# Patient Record
Sex: MALE | Race: WHITE | Employment: OTHER | URBAN - METROPOLITAN AREA
[De-identification: names, ages, dates, MRNs, and addresses within clinical notes are randomized per-mention and may not be internally consistent; named-entity substitution may affect disease eponyms.]

---

## 2017-02-20 ENCOUNTER — OFFICE VISIT (OUTPATIENT)
Dept: CARDIOLOGY CLINIC | Age: 72
End: 2017-02-20

## 2017-02-20 VITALS
WEIGHT: 177 LBS | HEART RATE: 51 BPM | HEIGHT: 71 IN | OXYGEN SATURATION: 97 % | BODY MASS INDEX: 24.78 KG/M2 | DIASTOLIC BLOOD PRESSURE: 74 MMHG | SYSTOLIC BLOOD PRESSURE: 126 MMHG

## 2017-02-20 DIAGNOSIS — Z98.61 CAD S/P PERCUTANEOUS CORONARY ANGIOPLASTY: Primary | ICD-10-CM

## 2017-02-20 DIAGNOSIS — I25.10 CAD S/P PERCUTANEOUS CORONARY ANGIOPLASTY: Primary | ICD-10-CM

## 2017-02-20 DIAGNOSIS — E78.00 HYPERCHOLESTEROLEMIA: ICD-10-CM

## 2017-02-20 DIAGNOSIS — I10 ESSENTIAL HYPERTENSION: ICD-10-CM

## 2017-02-20 RX ORDER — LISINOPRIL 5 MG/1
5 TABLET ORAL DAILY
COMMUNITY
End: 2017-03-16 | Stop reason: SDUPTHER

## 2017-02-20 NOTE — MR AVS SNAPSHOT
Visit Information Date & Time Provider Department Dept. Phone Encounter #  
 2/20/2017  3:40 PM Karyle Fairly, MD [facility] [address] [id number] Upcoming Health Maintenance Date Due DTaP/Tdap/Td series (1 - Tdap) 6/8/1964 FOBT Q 1 YEAR AGE 50-75 6/8/1993 ZOSTER VACCINE AGE 60> 6/8/2003 GLAUCOMA SCREENING Q2Y 6/8/2008 Pneumococcal 65+ Low/Medium Risk (1 of 2 - PCV13) 6/8/2008 MEDICARE YEARLY EXAM 6/8/2008 INFLUENZA AGE 9 TO ADULT 8/1/2016 Allergies as of 2/20/2017  Review Complete On: 8/30/2016 By: Karyle Fairly, MD  
  
 Severity Noted Reaction Type Reactions Pcn [Penicillins]  07/06/2015    Other (comments) Current Immunizations  Never Reviewed No immunizations on file. Not reviewed this visit You Were Diagnosed With   
  
 Codes Comments CAD S/P percutaneous coronary angioplasty    -  Primary ICD-10-CM: I25.10, Z98.61 ICD-9-CM: 414.01, V45.82 Hypercholesterolemia     ICD-10-CM: E78.00 ICD-9-CM: 272.0 Essential hypertension     ICD-10-CM: I10 
ICD-9-CM: 401.9 Vitals BP Pulse Height(growth percentile) Weight(growth percentile) SpO2 BMI  
 126/74 (!) 51 5' 11\" (1.803 m) 177 lb (80.3 kg) 97% 24.69 kg/m2 Smoking Status Former Smoker Vitals History BMI and BSA Data Body Mass Index Body Surface Area  
 24.69 kg/m 2 2.01 m 2 Preferred Pharmacy Pharmacy Name Phone [facility] [place], [address] [place] [address] [address] [phone] Your Updated Medication List  
  
   
This list is accurate as of: 2/20/17  4:23 PM.  Always use your most recent med list.  
  
  
  
  
 aspirin 81 mg chewable tablet Take 1 Tab by mouth daily. carvedilol 3.125 mg tablet Commonly known as:  Macel Raphael Take 1 Tab by mouth two (2) times daily (with meals). clopidogrel 75 mg Tab Commonly known as:  PLAVIX Take 1 Tab by mouth daily. lisinopril 5 mg tablet Commonly known as:  Jewell Reasons Take 5 mg by mouth daily. nitroglycerin 0.4 mg SL tablet Commonly known as:  NITROSTAT  
1 Tab by SubLINGual route every five (5) minutes as needed for Chest Pain. rosuvastatin 20 mg tablet Commonly known as:  CRESTOR  
take 1 tablet by mouth at bedtime We Performed the Following AMB POC EKG ROUTINE W/ 12 LEADS, INTER & REP [61924 CPT(R)] Introducing [facility] & HEALTH SERVICES! Lexx Fischer introduces Retevo patient portal. Now you can access parts of your medical record, email your doctor's office, and request medication refills online. 1. In your internet browser, go to [email or web address] Inti Dharma 2. Click on the First Time User? Click Here link in the Sign In box. You will see the New Member Sign Up page. 3. Enter your Retevo Access Code exactly as it appears below. You will not need to use this code after youve completed the sign-up process. If you do not sign up before the expiration date, you must request a new code. · Retevo Access Code: [email or web address] Expires: 5/21/2017  3:43 PM 
 
4. Enter the last four digits of your Social Security Number (xxxx) and Date of Birth (mm/dd/yyyy) as indicated and click Submit. You will be taken to the next sign-up page. 5. Create a Retevo ID. This will be your Retevo login ID and cannot be changed, so think of one that is secure and easy to remember. 6. Create a Retevo password. You can change your password at any time. 7. Enter your Password Reset Question and Answer. This can be used at a later time if you forget your password. 8. Enter your e-mail address. You will receive e-mail notification when new information is available in [address] 9. Click Sign Up. You can now view and download portions of your medical record. 10. Click the Download Summary menu link to download a portable copy of your medical information. If you have questions, please visit the Frequently Asked Questions section of the Show de Ingress[employer] website. Remember, Redgage is NOT to be used for urgent needs. For medical emergencies, dial 911. Now available from your iPhone and Android! Please provide this summary of care documentation to your next provider. Your primary care clinician is listed as Reda Popper. If you have any questions after today's visit, please call [phone].

## 2017-02-20 NOTE — PROGRESS NOTES
1. Have you been to the ER, urgent care clinic since your last visit? Hospitalized since your last visit? No     2. Have you seen or consulted any other health care providers outside of the [address] [place] [address] since your last visit? Include any pap smears or colon screening.  No

## 2017-02-20 NOTE — PROGRESS NOTES
HISTORY OF PRESENT ILLNESS  Clayton Redding is a 66 y.o. male. HPI       Patient presents for a follow-up office visit. He has a past medical history significant for an acute anterior wall myocardial infarction in July 2015 with associated cardiac arrest.  He underwent emergent angioplasty and stenting of 100% mid LAD occlusion with a drug-eluting stent. He did have residual branch vessel disease that has been treated medically. His ejection fraction improved to normal on a follow-up echocardiogram in November 2015 with some apical wall hypokinesis, EF 55%. The patient was last seen in the office 6 months ago. At his last visit, he was complaining of increased dizziness which is felt to be related to polypharmacy. It turned out he was taking 2 separate beta-blockers. 1 of his beta-blocker was discontinued, and his lisinopril dosage was decreased. With these medication changes, he has been feeling much better. No recurrent dizzy spells, palpitations, syncope or near syncope. He has not had any chest pain or pressure. No shortness of breath at rest or exertion. No orthopnea, PND, leg swelling, or claudication. Past Medical History   Diagnosis Date    CAD S/P percutaneous coronary angioplasty 7/6/15     Ant STEMI and cardiac arrest (July 2015): 100% mLAD, 55% D2, 90% OM2, 605RCA (non dominant). s/p PCI of LAD iwth POLI (Resolute 2.5 x 26, post dil 2.75 NC). LV EF 35-40% (echo)    Cardiac echocardiogram normal 11/11/2015     Ltd study. EF 55% (prev 40% on study of 7/7/15). Mild apical hypk.  History of CVA (cerebrovascular accident)     Hypercholesterolemia     Hypertension     Memory disorder      Current Outpatient Prescriptions   Medication Sig Dispense Refill    lisinopril (PRINIVIL, ZESTRIL) 5 mg tablet Take 5 mg by mouth daily.       rosuvastatin (CRESTOR) 20 mg tablet take 1 tablet by mouth at bedtime 30 Tab 6    carvedilol (COREG) 3.125 mg tablet Take 1 Tab by mouth two (2) times daily (with meals). 60 Tab 6    clopidogrel (PLAVIX) 75 mg tablet Take 1 Tab by mouth daily. 30 Tab 6    nitroglycerin (NITROSTAT) 0.4 mg SL tablet 1 Tab by SubLINGual route every five (5) minutes as needed for Chest Pain. 25 Tab 3    aspirin 81 mg chewable tablet Take 1 Tab by mouth daily. 30 Tab 0     Allergies   Allergen Reactions    Pcn [Penicillins] Other (comments)      Social History   Substance Use Topics    Smoking status: Former Smoker     Packs/day: 0.50     Years: 45.00     Quit date: 8/12/2005    Smokeless tobacco: Never Used    Alcohol use No         Review of Systems   Constitutional: Negative for chills, fever and weight loss. HENT: Negative for nosebleeds. Eyes: Negative for blurred vision and double vision. Respiratory: Negative for cough, shortness of breath and wheezing. Cardiovascular: Negative for chest pain, palpitations, orthopnea, claudication, leg swelling and PND. Gastrointestinal: Negative for abdominal pain, heartburn, nausea and vomiting. Genitourinary: Negative for dysuria and hematuria. Musculoskeletal: Negative for falls and myalgias. Skin: Negative for rash. Neurological: Negative for dizziness, focal weakness and headaches. Endo/Heme/Allergies: Does not bruise/bleed easily. Psychiatric/Behavioral: Negative for substance abuse. Visit Vitals    /74    Pulse (!) 51    Ht 5' 11\" (1.803 m)    Wt 80.3 kg (177 lb)    SpO2 97%    BMI 24.69 kg/m2       Physical Exam   Constitutional: He is oriented to person, place, and time. He appears well-developed and well-nourished. HENT:   Head: Normocephalic and atraumatic. Eyes: Conjunctivae are normal.   Neck: Neck supple. No JVD present. Carotid bruit is not present. Cardiovascular: Regular rhythm, S1 normal, S2 normal and normal pulses. Bradycardia present. Exam reveals no gallop and no S3. No murmur heard. Pulmonary/Chest: Breath sounds normal. He has no wheezes. He has no rales. Abdominal: Soft. Bowel sounds are normal. There is no tenderness. Musculoskeletal: He exhibits no edema. Neurological: He is alert and oriented to person, place, and time. Skin: Skin is warm and dry. EKG: Sinus bradycardia, normal axis, borderline low voltage throughout, right bundle branch block, poor R-wave progression, cannot exclude prior anterior infarct, no ST or T-wave abnormalities concerning for ischemia. No change compared to the prior EKG. ASSESSMENT and PLAN    Coronary artery disease. Status post emergent PCI in the setting of an acute anterior wall myocardial infarction July 2015 with subsequent angioplasty and drug-eluting stent placement to his mid LAD. Patient had residual branch vessel disease to a small obtuse marginal branch and moderate nonobstructive CAD elsewhere. His ejection fraction improved to 55% on a follow-up echocardiogram.  He remains on dual antiplatelet therapy with an aspirin and clopidogrel. He is tolerating a low dosage of carvedilol and a potent statin. I will continue his current medical regimen. No new symptoms concerning for angina. Dyslipidemia. Patient is now maintained on Crestor 20 mg nightly. His goal LDL should be under 70. Essential hypertension. Patient's blood pressure is now reasonably well-controlled on a low-dose of carvedilol and lisinopril. Both which I would continue. Follow-up in 6 months, sooner if needed.

## 2017-03-17 RX ORDER — LISINOPRIL 5 MG/1
5 TABLET ORAL DAILY
Qty: 90 TAB | Refills: 3 | Status: SHIPPED | OUTPATIENT
Start: 2017-03-17 | End: 2018-03-21 | Stop reason: SDUPTHER

## 2017-03-22 RX ORDER — CLOPIDOGREL BISULFATE 75 MG/1
TABLET ORAL
Qty: 30 TAB | Refills: 6 | Status: SHIPPED | OUTPATIENT
Start: 2017-03-22 | End: 2017-10-18 | Stop reason: SDUPTHER

## 2017-05-24 RX ORDER — CARVEDILOL 3.12 MG/1
3.12 TABLET ORAL 2 TIMES DAILY WITH MEALS
Qty: 60 TAB | Refills: 6 | Status: SHIPPED | OUTPATIENT
Start: 2017-05-24 | End: 2018-01-02 | Stop reason: SDUPTHER

## 2017-05-24 RX ORDER — ROSUVASTATIN CALCIUM 20 MG/1
TABLET, COATED ORAL
Qty: 30 TAB | Refills: 6 | Status: SHIPPED | OUTPATIENT
Start: 2017-05-24 | End: 2018-01-10 | Stop reason: SDUPTHER

## 2017-10-17 ENCOUNTER — OFFICE VISIT (OUTPATIENT)
Dept: CARDIOLOGY CLINIC | Age: 72
End: 2017-10-17

## 2017-10-17 VITALS
HEIGHT: 71 IN | SYSTOLIC BLOOD PRESSURE: 102 MMHG | WEIGHT: 171 LBS | OXYGEN SATURATION: 98 % | DIASTOLIC BLOOD PRESSURE: 72 MMHG | BODY MASS INDEX: 23.94 KG/M2 | HEART RATE: 45 BPM

## 2017-10-17 DIAGNOSIS — I25.10 CAD S/P PERCUTANEOUS CORONARY ANGIOPLASTY: ICD-10-CM

## 2017-10-17 DIAGNOSIS — Z98.61 CAD S/P PERCUTANEOUS CORONARY ANGIOPLASTY: ICD-10-CM

## 2017-10-17 DIAGNOSIS — E78.00 HYPERCHOLESTEROLEMIA: ICD-10-CM

## 2017-10-17 DIAGNOSIS — I10 ESSENTIAL HYPERTENSION: Primary | ICD-10-CM

## 2017-10-17 DIAGNOSIS — R00.1 BRADYCARDIA, SINUS: ICD-10-CM

## 2017-10-17 NOTE — PROGRESS NOTES
1. Have you been to the ER, urgent care clinic since your last visit? Hospitalized since your last visit?no    2. Have you seen or consulted any other health care providers outside of the [address] [place] [address] since your last visit? Include any pap smears or colon screening.  no

## 2017-10-17 NOTE — MR AVS SNAPSHOT
Visit Information Date & Time Provider Department Dept. Phone Encounter #  
 10/17/2017  2:20 PM Efren Ayala MD [facility] [address] [id number] Upcoming Health Maintenance Date Due DTaP/Tdap/Td series (1 - Tdap) 6/8/1964 FOBT Q 1 YEAR AGE 50-75 6/8/1993 ZOSTER VACCINE AGE 60> 4/8/2003 GLAUCOMA SCREENING Q2Y 6/8/2008 Pneumococcal 65+ Low/Medium Risk (1 of 2 - PCV13) 6/8/2008 MEDICARE YEARLY EXAM 6/8/2008 INFLUENZA AGE 9 TO ADULT 8/1/2017 Allergies as of 10/17/2017  Review Complete On: 2/20/2017 By: Efren Ayala MD  
  
 Severity Noted Reaction Type Reactions Pcn [Penicillins]  07/06/2015    Other (comments) Current Immunizations  Never Reviewed No immunizations on file. Not reviewed this visit You Were Diagnosed With   
  
 Codes Comments Essential hypertension    -  Primary ICD-10-CM: I10 
ICD-9-CM: 401.9 Vitals BP Pulse Height(growth percentile) Weight(growth percentile) SpO2 BMI  
 102/72 (!) 45 5' 11\" (1.803 m) 171 lb (77.6 kg) 98% 23.85 kg/m2 Smoking Status Former Smoker Vitals History BMI and BSA Data Body Mass Index Body Surface Area  
 23.85 kg/m 2 1.97 m 2 Preferred Pharmacy Pharmacy Name Phone [facility], [facility]. [address] [phone] Your Updated Medication List  
  
   
This list is accurate as of: 10/17/17  2:32 PM.  Always use your most recent med list.  
  
  
  
  
 aspirin 81 mg chewable tablet Take 1 Tab by mouth daily. carvedilol 3.125 mg tablet Commonly known as:  Dareen Kaska Take 1 Tab by mouth two (2) times daily (with meals). clopidogrel 75 mg Tab Commonly known as:  PLAVIX  
take 1 tablet by mouth once daily  
  
 lisinopril 5 mg tablet Commonly known as:  Joycelyn Primmer Take 1 Tab by mouth daily. nitroglycerin 0.4 mg SL tablet Commonly known as:  NITROSTAT  
1 Tab by SubLINGual route every five (5) minutes as needed for Chest Pain. rosuvastatin 20 mg tablet Commonly known as:  CRESTOR  
take 1 tablet by mouth at bedtime We Performed the Following AMB POC EKG ROUTINE W/ 12 LEADS, INTER & REP [34237 CPT(R)] Introducing [facility] & HEALTH SERVICES! Aries Busby introduces 10seconds Software patient portal. Now you can access parts of your medical record, email your doctor's office, and request medication refills online. 1. In your internet browser, go to [email or web address] Spine 2. Click on the First Time User? Click Here link in the Sign In box. You will see the New Member Sign Up page. 3. Enter your 10seconds Software Access Code exactly as it appears below. You will not need to use this code after youve completed the sign-up process. If you do not sign up before the expiration date, you must request a new code. · 10seconds Software Access Code: [email or web address] Expires: 1/15/2018  2:32 PM 
 
4. Enter the last four digits of your Social Security Number (xxxx) and Date of Birth (mm/dd/yyyy) as indicated and click Submit. You will be taken to the next sign-up page. 5. Create a 10seconds Software ID. This will be your 10seconds Software login ID and cannot be changed, so think of one that is secure and easy to remember. 6. Create a 10seconds Software password. You can change your password at any time. 7. Enter your Password Reset Question and Answer. This can be used at a later time if you forget your password. 8. Enter your e-mail address. You will receive e-mail notification when new information is available in [address] 9. Click Sign Up. You can now view and download portions of your medical record. 10. Click the Download Summary menu link to download a portable copy of your medical information. If you have questions, please visit the Frequently Asked Questions section of the 10seconds Software website.  Remember, 10seconds Software is NOT to be used for urgent needs. For medical emergencies, dial 911. Now available from your iPhone and Android! Please provide this summary of care documentation to your next provider. If you have any questions after today's visit, please call [phone].

## 2017-10-17 NOTE — PROGRESS NOTES
HISTORY OF PRESENT ILLNESS  Ashlee Bob is a 74 y.o. male. HPI       Patient presents for a follow-up office visit. He has a past medical history significant for an acute anterior wall myocardial infarction in July 2015 with associated cardiac arrest.  He underwent emergent angioplasty and stenting of 100% mid LAD occlusion with a drug-eluting stent. He did have residual branch vessel disease that has been treated medically. His ejection fraction improved to normal on a follow-up echocardiogram in November 2015 with some apical wall hypokinesis, EF 55%. The patient was last seen in the office 7-8 months ago. Since last visit he has been feeling well. He denies any change in his activity tolerance. No significant dizzy spells or increased fatigue. No chest pain, shortness of breath, leg swelling or claudication. Past Medical History:   Diagnosis Date    CAD S/P percutaneous coronary angioplasty 7/6/15    Ant STEMI and cardiac arrest (July 2015): 100% mLAD, 55% D2, 90% OM2, 605RCA (non dominant). s/p PCI of LAD iwth POLI (Resolute 2.5 x 26, post dil 2.75 NC). LV EF 35-40% (echo)    Cardiac echocardiogram normal 11/11/2015    Ltd study. EF 55% (prev 40% on study of 7/7/15). Mild apical hypk.  History of CVA (cerebrovascular accident)     Hypercholesterolemia     Hypertension     Memory disorder      Current Outpatient Prescriptions   Medication Sig Dispense Refill    rosuvastatin (CRESTOR) 20 mg tablet take 1 tablet by mouth at bedtime 30 Tab 6    carvedilol (COREG) 3.125 mg tablet Take 1 Tab by mouth two (2) times daily (with meals). 60 Tab 6    clopidogrel (PLAVIX) 75 mg tab take 1 tablet by mouth once daily 30 Tab 6    lisinopril (PRINIVIL, ZESTRIL) 5 mg tablet Take 1 Tab by mouth daily. 90 Tab 3    nitroglycerin (NITROSTAT) 0.4 mg SL tablet 1 Tab by SubLINGual route every five (5) minutes as needed for Chest Pain. 25 Tab 3    aspirin 81 mg chewable tablet Take 1 Tab by mouth daily. 30 Tab 0     Allergies   Allergen Reactions    Pcn [Penicillins] Other (comments)      Social History   Substance Use Topics    Smoking status: Former Smoker     Packs/day: 0.50     Years: 45.00     Quit date: 8/12/2005    Smokeless tobacco: Never Used    Alcohol use No         Review of Systems   Constitutional: Negative for chills, fever and weight loss. HENT: Negative for nosebleeds. Eyes: Negative for blurred vision and double vision. Respiratory: Negative for cough, shortness of breath and wheezing. Cardiovascular: Negative for chest pain, palpitations, orthopnea, claudication, leg swelling and PND. Gastrointestinal: Negative for abdominal pain, heartburn, nausea and vomiting. Genitourinary: Negative for dysuria and hematuria. Musculoskeletal: Negative for falls and myalgias. Skin: Negative for rash. Neurological: Negative for dizziness, focal weakness and headaches. Endo/Heme/Allergies: Does not bruise/bleed easily. Psychiatric/Behavioral: Negative for substance abuse. Visit Vitals    /72    Pulse (!) 45    Ht 5' 11\" (1.803 m)    Wt 77.6 kg (171 lb)    SpO2 98%    BMI 23.85 kg/m2       Physical Exam   Constitutional: He is oriented to person, place, and time. He appears well-developed and well-nourished. HENT:   Head: Normocephalic and atraumatic. Eyes: Conjunctivae are normal.   Neck: Neck supple. No JVD present. Carotid bruit is not present. Cardiovascular: Regular rhythm, S1 normal, S2 normal and normal pulses. Bradycardia present. Exam reveals no gallop and no S3. No murmur heard. Pulmonary/Chest: Breath sounds normal. He has no wheezes. He has no rales. Abdominal: Soft. Bowel sounds are normal. There is no tenderness. Musculoskeletal: He exhibits no edema. Neurological: He is alert and oriented to person, place, and time. Skin: Skin is warm and dry.      EKG: Sinus bradycardia, normal axis, borderline low voltage throughout, right bundle branch block, poor R-wave progression, cannot exclude prior anterior infarct, no ST or T-wave abnormalities concerning for ischemia. No change compared to the prior EKG. ASSESSMENT and PLAN    Coronary artery disease. Status post emergent PCI in the setting of an acute anterior wall myocardial infarction July 2015 with subsequent angioplasty and drug-eluting stent placement to his mid LAD. Patient had residual branch vessel disease to a small obtuse marginal branch and moderate nonobstructive CAD elsewhere. His ejection fraction improved to 55% on a follow-up echocardiogram.  He remains on dual antiplatelet therapy with an aspirin and clopidogrel. He is tolerating a low dosage of carvedilol and a potent statin. No significant change in his activity level. I will continue his current medical regimen. Dyslipidemia. Patient is now maintained on Crestor 20 mg nightly. His goal LDL should be under 70. Essential hypertension. Patient's blood pressure is now reasonably well-controlled on a low-dose of carvedilol and lisinopril. Both which I would continue. Asymptomatic bradycardia. Patient's heart rate remains between 45 and 55 bpm.  He continues to be asymptomatic. I will continue his low-dose beta-blocker for now. Follow-up in 6 months, sooner if needed.

## 2017-10-18 RX ORDER — CLOPIDOGREL BISULFATE 75 MG/1
TABLET ORAL
Qty: 30 TAB | Refills: 11 | Status: SHIPPED | OUTPATIENT
Start: 2017-10-18 | End: 2018-10-19 | Stop reason: SDUPTHER

## 2018-01-03 RX ORDER — CARVEDILOL 3.12 MG/1
TABLET ORAL
Qty: 60 TAB | Refills: 6 | Status: SHIPPED | OUTPATIENT
Start: 2018-01-03 | End: 2019-04-02 | Stop reason: SDUPTHER

## 2018-01-10 RX ORDER — ROSUVASTATIN CALCIUM 20 MG/1
TABLET, COATED ORAL
Qty: 90 TAB | Refills: 3 | Status: SHIPPED | OUTPATIENT
Start: 2018-01-10 | End: 2019-01-01 | Stop reason: SDUPTHER

## 2018-03-22 RX ORDER — LISINOPRIL 5 MG/1
TABLET ORAL
Qty: 90 TAB | Refills: 3 | Status: SHIPPED | OUTPATIENT
Start: 2018-03-22 | End: 2019-04-02 | Stop reason: SDUPTHER

## 2018-04-23 ENCOUNTER — OFFICE VISIT (OUTPATIENT)
Dept: CARDIOLOGY CLINIC | Age: 73
End: 2018-04-23

## 2018-04-23 VITALS
BODY MASS INDEX: 23.38 KG/M2 | HEIGHT: 71 IN | OXYGEN SATURATION: 98 % | DIASTOLIC BLOOD PRESSURE: 66 MMHG | HEART RATE: 53 BPM | SYSTOLIC BLOOD PRESSURE: 132 MMHG | WEIGHT: 167 LBS

## 2018-04-23 DIAGNOSIS — R00.1 BRADYCARDIA, SINUS: ICD-10-CM

## 2018-04-23 DIAGNOSIS — I10 ESSENTIAL HYPERTENSION: ICD-10-CM

## 2018-04-23 DIAGNOSIS — I25.10 CAD S/P PERCUTANEOUS CORONARY ANGIOPLASTY: Primary | ICD-10-CM

## 2018-04-23 DIAGNOSIS — Z98.61 CAD S/P PERCUTANEOUS CORONARY ANGIOPLASTY: Primary | ICD-10-CM

## 2018-04-23 DIAGNOSIS — E78.00 HYPERCHOLESTEROLEMIA: ICD-10-CM

## 2018-04-23 DIAGNOSIS — F03.90 DEMENTIA WITHOUT BEHAVIORAL DISTURBANCE, UNSPECIFIED DEMENTIA TYPE: ICD-10-CM

## 2018-04-23 NOTE — MR AVS SNAPSHOT
[address] [place] [address] [address] [place] [address] 
[phone] Patient: Reagan Sun MRN: [id number]  Visit Information Date & Time Provider Department Dept. Phone Encounter #  
 2018  2:00 PM Clem Mcdermott MD [facility] [address] [id number] Your Appointments 10/19/2018 11:40 AM  
Follow Up with Clem Mcdermott MD  
[facility] Appt Note: 6 month follow up [place] [id number]  
[phone] [facility] [address] Upcoming Health Maintenance Date Due DTaP/Tdap/Td series (1 - Tdap) 1964 FOBT Q 1 YEAR AGE 50-75 1993 ZOSTER VACCINE AGE 60> 2003 GLAUCOMA SCREENING Q2Y 2008 Pneumococcal 65+ Low/Medium Risk (1 of 2 - PCV13) 2008 Influenza Age 5 to Adult 2017 MEDICARE YEARLY EXAM 3/14/2018 Allergies as of 2018  Review Complete On: 10/17/2017 By: Clem Mcdermott MD  
  
 Severity Noted Reaction Type Reactions Pcn [Penicillins]  2015    Other (comments) Current Immunizations  Never Reviewed No immunizations on file. Not reviewed this visit You Were Diagnosed With   
  
 Codes Comments Bradycardia, sinus    -  Primary ICD-10-CM: R00.1 ICD-9-CM: 427.89 Essential hypertension     ICD-10-CM: I10 
ICD-9-CM: 401.9   
 CAD S/P percutaneous coronary angioplasty     ICD-10-CM: I25.10, Z98.61 ICD-9-CM: 414.01, V45.82 Vitals BP Pulse Height(growth percentile) Weight(growth percentile) SpO2 BMI  
 132/66 (!) 53 5' 11\" (1.803 m) 167 lb (75.8 kg) 98% 23.29 kg/m2 Smoking Status Former Smoker Vitals History BMI and BSA Data Body Mass Index Body Surface Area  
 23.29 kg/m 2 1.95 m 2 Preferred Pharmacy Pharmacy Name Phone [facility], [address] [phone] Your Updated Medication List  
  
   
This list is accurate as of 4/23/18  3:43 PM.  Always use your most recent med list.  
  
  
  
  
 aspirin 81 mg chewable tablet Take 1 Tab by mouth daily. carvedilol 3.125 mg tablet Commonly known as:  COREG  
take 1 tablet by mouth twice a day with food  
  
 clopidogrel 75 mg Tab Commonly known as:  PLAVIX  
take 1 tablet by mouth once daily  
  
 lisinopril 5 mg tablet Commonly known as:  PRINIVIL, ZESTRIL  
TAKE 1 TABLET BY MOUTH DAILY  
  
 nitroglycerin 0.4 mg SL tablet Commonly known as:  NITROSTAT  
1 Tab by SubLINGual route every five (5) minutes as needed for Chest Pain. rosuvastatin 20 mg tablet Commonly known as:  CRESTOR  
take 1 tablet by mouth at bedtime We Performed the Following AMB POC EKG ROUTINE W/ 12 LEADS, INTER & REP [81980 CPT(R)] Introducing [facility] & [facility] SERVICES! [employer] introduces MyFab patient portal. Now you can access parts of your medical record, email your doctor's office, and request medication refills online. 1. In your internet browser, go to [email or web address] 2. Click on the First Time User? Click Here link in the Sign In box. You will see the New Member Sign Up page. 3. Enter your MyFab Access Code exactly as it appears below. You will not need to use this code after youve completed the sign-up process. If you do not sign up before the expiration date, you must request a new code. · MyFab Access Code: [email or web address] Expires: 7/22/2018  1:50 PM 
 
4. Enter the last four digits of your Social Security Number (xxxx) and Date of Birth (mm/dd/yyyy) as indicated and click Submit. You will be taken to the next sign-up page. 5. Create a MyFab ID.  This will be your MyFab login ID and cannot be changed, so think of one that is secure and easy to remember. 6. Create a Solar Nation password. You can change your password at any time. 7. Enter your Password Reset Question and Answer. This can be used at a later time if you forget your password. 8. Enter your e-mail address. You will receive e-mail notification when new information is available in [address] 9. Click Sign Up. You can now view and download portions of your medical record. 10. Click the Download Summary menu link to download a portable copy of your medical information. If you have questions, please visit the Frequently Asked Questions section of the [employer] Nation website. Remember, [employer] Nation is NOT to be used for urgent needs. For medical emergencies, dial 911. Now available from your iPhone and Android! Please provide this summary of care documentation to your next provider. Your primary care clinician is listed as Rhiannon Montesinos. If you have any questions after today's visit, please call [phone].

## 2018-04-23 NOTE — PROGRESS NOTES
HISTORY OF PRESENT ILLNESS  Tony Bhatti is a 74 y.o. male. HPI       Patient presents for a follow-up office visit. He has a past medical history significant for an acute anterior wall myocardial infarction in July 2015 with associated cardiac arrest.  He underwent emergent angioplasty and stenting of 100% mid LAD occlusion with a drug-eluting stent. He did have residual branch vessel disease that has been treated medically. His ejection fraction improved to normal on a follow-up echocardiogram in November 2015 with some apical wall hypokinesis, EF 55%. The patient was last seen in the office 6 months ago. Since last visit, he has been developing worsening dementia with progressive memory loss. He complains of intermittent exertional dyspnea, but overall feels his activity tolerance is unchanged. No new chest pain or pressure, no orthopnea, PND or leg swelling. No claudication. Past Medical History:   Diagnosis Date    CAD S/P percutaneous coronary angioplasty 7/6/15    Ant STEMI and cardiac arrest (July 2015): 100% mLAD, 55% D2, 90% OM2, 605RCA (non dominant). s/p PCI of LAD iwth POLI (Resolute 2.5 x 26, post dil 2.75 NC). LV EF 35-40% (echo)    Cardiac echocardiogram normal 11/11/2015    Ltd study. EF 55% (prev 40% on study of 7/7/15). Mild apical hypk.  History of CVA (cerebrovascular accident)     Hypercholesterolemia     Hypertension     Memory disorder      Current Outpatient Prescriptions   Medication Sig Dispense Refill    lisinopril (PRINIVIL, ZESTRIL) 5 mg tablet TAKE 1 TABLET BY MOUTH DAILY 90 Tab 3    rosuvastatin (CRESTOR) 20 mg tablet take 1 tablet by mouth at bedtime 90 Tab 3    carvedilol (COREG) 3.125 mg tablet take 1 tablet by mouth twice a day with food 60 Tab 6    clopidogrel (PLAVIX) 75 mg tab take 1 tablet by mouth once daily 30 Tab 11    nitroglycerin (NITROSTAT) 0.4 mg SL tablet 1 Tab by SubLINGual route every five (5) minutes as needed for Chest Pain.  25 Tab 3  aspirin 81 mg chewable tablet Take 1 Tab by mouth daily. 30 Tab 0     Allergies   Allergen Reactions    Pcn [Penicillins] Other (comments)      Social History   Substance Use Topics    Smoking status: Former Smoker     Packs/day: 0.50     Years: 45.00     Quit date: 8/12/2005    Smokeless tobacco: Never Used    Alcohol use No       Review of Systems   Constitutional: Negative for chills, fever and weight loss. HENT: Negative for nosebleeds. Eyes: Negative for blurred vision and double vision. Respiratory: Negative for cough, shortness of breath and wheezing. Cardiovascular: Negative for chest pain, palpitations, orthopnea, claudication, leg swelling and PND. Gastrointestinal: Negative for abdominal pain, heartburn, nausea and vomiting. Genitourinary: Negative for dysuria and hematuria. Musculoskeletal: Negative for falls and myalgias. Skin: Negative for rash. Neurological: Negative for dizziness, focal weakness and headaches. Endo/Heme/Allergies: Does not bruise/bleed easily. Psychiatric/Behavioral: Positive for memory loss. Negative for substance abuse. Visit Vitals    /66    Pulse (!) 53    Ht 5' 11\" (1.803 m)    Wt 75.8 kg (167 lb)    SpO2 98%    BMI 23.29 kg/m2       Physical Exam   Constitutional: He is oriented to person, place, and time. He appears well-developed and well-nourished. HENT:   Head: Normocephalic and atraumatic. Eyes: Conjunctivae are normal.   Neck: Neck supple. No JVD present. Carotid bruit is not present. Cardiovascular: Regular rhythm, S1 normal, S2 normal and normal pulses. Bradycardia present. Exam reveals no gallop and no S3. No murmur heard. Pulmonary/Chest: Breath sounds normal. He has no wheezes. He has no rales. Abdominal: Soft. Bowel sounds are normal. There is no tenderness. Musculoskeletal: He exhibits no edema. Neurological: He is alert and oriented to person, place, and time. Skin: Skin is warm and dry.      EKG: Sinus bradycardia, normal axis, borderline low voltage throughout, right bundle branch block, poor R-wave progression, cannot exclude prior anterior infarct, no ST or T-wave abnormalities concerning for ischemia. No change compared to the prior EKG. ASSESSMENT and PLAN    Coronary artery disease. Status post emergent PCI in the setting of an acute anterior wall myocardial infarction July 2015 with subsequent angioplasty and drug-eluting stent placement to his mid LAD. Patient had residual branch vessel disease to a small obtuse marginal branch and moderate nonobstructive CAD elsewhere. His ejection fraction improved to 55% on a follow-up echocardiogram.  He remains on dual antiplatelet therapy with an aspirin and clopidogrel. He is also been treated with a stable dose of a beta-blocker and a potent statin. No new symptoms concerning for angina. Dyslipidemia. Patient is now maintained on Crestor 20 mg nightly. His goal LDL should be under 70. Essential hypertension. Patient's blood pressure is now reasonably well-controlled on a low-dose of carvedilol and lisinopril. Both which I would continue. Asymptomatic bradycardia. Patient's heart rate remains between 50 and 55 bpm.  He continues to be asymptomatic. I will continue his low-dose beta-blocker for now. If his heart rate is consistently lower, consider decreasing the dose of his carvedilol. Dementia. Patient has had progressive worsening of his memory loss over the past 6-12 months. Follow-up in 6 months, sooner if needed.

## 2018-04-23 NOTE — PROGRESS NOTES
1. Have you been to the ER, urgent care clinic since your last visit? Hospitalized since your last visit? No     2. Have you seen or consulted any other health care providers outside of the [address] [place] [address] since your last visit? Include any pap smears or colon screening.  No

## 2018-10-19 RX ORDER — CLOPIDOGREL BISULFATE 75 MG/1
TABLET ORAL
Qty: 30 TAB | Refills: 6 | Status: SHIPPED | OUTPATIENT
Start: 2018-10-19 | End: 2019-01-01 | Stop reason: SDUPTHER

## 2019-01-01 ENCOUNTER — HOSPITAL ENCOUNTER (OUTPATIENT)
Dept: NON INVASIVE DIAGNOSTICS | Age: 74
Discharge: HOME | End: 2019-05-07
Attending: INTERNAL MEDICINE
Payer: MEDICARE

## 2019-01-01 VITALS
HEIGHT: 71 IN | DIASTOLIC BLOOD PRESSURE: 70 MMHG | BODY MASS INDEX: 23.8 KG/M2 | WEIGHT: 170 LBS | SYSTOLIC BLOOD PRESSURE: 128 MMHG

## 2019-01-01 DIAGNOSIS — Z98.61 CAD S/P PERCUTANEOUS CORONARY ANGIOPLASTY: ICD-10-CM

## 2019-01-01 DIAGNOSIS — I25.10 CAD S/P PERCUTANEOUS CORONARY ANGIOPLASTY: ICD-10-CM

## 2019-01-01 LAB
STRESS BASELINE DIAS BP: 70 MMHG
STRESS BASELINE HR: 43 BPM
STRESS BASELINE SYS BP: 128 MMHG
STRESS ESTIMATED WORKLOAD: 1 METS
STRESS EXERCISE DUR MIN: NORMAL
STRESS PEAK DIAS BP: 60 MMHG
STRESS PEAK SYS BP: 130 MMHG
STRESS PERCENT HR ACHIEVED: 38 %
STRESS POST PEAK HR: 55 BPM
STRESS RATE PRESSURE PRODUCT: 7150 BPM*MMHG
STRESS TARGET HR: 145 BPM

## 2019-01-01 PROCEDURE — 93017 CV STRESS TEST TRACING ONLY: CPT

## 2019-01-01 PROCEDURE — 74011250636 HC RX REV CODE- 250/636: Performed by: INTERNAL MEDICINE

## 2019-01-01 RX ORDER — CLOPIDOGREL BISULFATE 75 MG/1
TABLET ORAL
Qty: 30 TAB | Refills: 6 | Status: SHIPPED | OUTPATIENT
Start: 2019-01-01 | End: 2019-01-01 | Stop reason: SDUPTHER

## 2019-01-01 RX ORDER — SODIUM CHLORIDE 9 MG/ML
100 INJECTION, SOLUTION INTRAVENOUS ONCE
Status: COMPLETED | OUTPATIENT
Start: 2019-01-01 | End: 2019-01-01

## 2019-01-01 RX ORDER — CLOPIDOGREL BISULFATE 75 MG/1
TABLET ORAL
Qty: 30 TAB | Refills: 6 | Status: SHIPPED | OUTPATIENT
Start: 2019-01-01

## 2019-01-01 RX ORDER — SODIUM CHLORIDE 0.9 % (FLUSH) 0.9 %
10 SYRINGE (ML) INJECTION AS NEEDED
Status: COMPLETED | OUTPATIENT
Start: 2019-01-01 | End: 2019-01-01

## 2019-01-01 RX ORDER — ROSUVASTATIN CALCIUM 20 MG/1
TABLET, COATED ORAL
Qty: 90 TAB | Refills: 3 | Status: SHIPPED | OUTPATIENT
Start: 2019-01-01

## 2019-01-01 RX ADMIN — Medication 10 ML: at 08:10

## 2019-01-01 RX ADMIN — SODIUM CHLORIDE 100 ML/HR: 900 INJECTION, SOLUTION INTRAVENOUS at 08:10

## 2019-01-01 RX ADMIN — REGADENOSON 0.4 MG: 0.08 INJECTION, SOLUTION INTRAVENOUS at 09:15

## 2019-04-02 RX ORDER — LISINOPRIL 5 MG/1
TABLET ORAL
Qty: 90 TAB | Refills: 3 | Status: SHIPPED | OUTPATIENT
Start: 2019-04-02

## 2019-04-02 RX ORDER — CARVEDILOL 3.12 MG/1
TABLET ORAL
Qty: 180 TAB | Refills: 3 | Status: SHIPPED | OUTPATIENT
Start: 2019-04-02

## 2019-04-17 ENCOUNTER — OFFICE VISIT (OUTPATIENT)
Dept: CARDIOLOGY CLINIC | Age: 74
End: 2019-04-17

## 2019-04-17 VITALS
DIASTOLIC BLOOD PRESSURE: 70 MMHG | OXYGEN SATURATION: 98 % | HEART RATE: 51 BPM | HEIGHT: 71 IN | WEIGHT: 170 LBS | BODY MASS INDEX: 23.8 KG/M2 | SYSTOLIC BLOOD PRESSURE: 130 MMHG

## 2019-04-17 DIAGNOSIS — I10 ESSENTIAL HYPERTENSION: ICD-10-CM

## 2019-04-17 DIAGNOSIS — E78.00 HYPERCHOLESTEROLEMIA: ICD-10-CM

## 2019-04-17 DIAGNOSIS — I25.10 CAD S/P PERCUTANEOUS CORONARY ANGIOPLASTY: Primary | ICD-10-CM

## 2019-04-17 DIAGNOSIS — R00.1 BRADYCARDIA, SINUS: ICD-10-CM

## 2019-04-17 DIAGNOSIS — Z98.61 CAD S/P PERCUTANEOUS CORONARY ANGIOPLASTY: Primary | ICD-10-CM

## 2019-04-17 NOTE — PROGRESS NOTES
HISTORY OF PRESENT ILLNESS Amanda Strickland is a 74 y.o. male. HPI Patient presents for a follow-up office visit. He has a past medical history significant for an acute anterior wall myocardial infarction in July 2015 with associated cardiac arrest.  He underwent emergent angioplasty and stenting of 100% mid LAD occlusion with a drug-eluting stent. He did have residual branch vessel disease that has been treated medically. His ejection fraction improved to normal on a follow-up echocardiogram in November 2015 with some apical wall hypokinesis, EF 55%. The patient was last seen in the office 12 months ago. Since last visit, he continues to have gradual dementia. According to the wife he is also having some behavioral issues. He denies any chest pain or shortness of breath. History is somewhat limited. Past Medical History:  
Diagnosis Date  CAD S/P percutaneous coronary angioplasty 7/6/15 Ant STEMI and cardiac arrest (July 2015): 100% mLAD, 55% D2, 90% OM2, 605RCA (non dominant). s/p PCI of LAD iwth POLI (Resolute 2.5 x 26, post dil 2.75 NC). LV EF 35-40% (echo)  Cardiac echocardiogram normal 11/11/2015 Ltd study. EF 55% (prev 40% on study of 7/7/15). Mild apical hypk.  History of CVA (cerebrovascular accident)  Hypercholesterolemia  Hypertension  Memory disorder Current Outpatient Medications Medication Sig Dispense Refill  carvedilol (COREG) 3.125 mg tablet take 1 tablet by mouth twice a day with food 180 Tab 3  
 lisinopril (PRINIVIL, ZESTRIL) 5 mg tablet TAKE 1 TABLET BY MOUTH DAILY 90 Tab 3  clopidogrel (PLAVIX) 75 mg tab take 1 tablet by mouth once daily 30 Tab 6  
 rosuvastatin (CRESTOR) 20 mg tablet take 1 tablet by mouth at bedtime 90 Tab 3  
 nitroglycerin (NITROSTAT) 0.4 mg SL tablet 1 Tab by SubLINGual route every five (5) minutes as needed for Chest Pain. 25 Tab 3  
 aspirin 81 mg chewable tablet Take 1 Tab by mouth daily.  30 Tab 0  
 
 Allergies Allergen Reactions  Pcn [Penicillins] Other (comments) Social History Tobacco Use  Smoking status: Former Smoker Packs/day: 0.50 Years: 45.00 Pack years: 22.50 Last attempt to quit: 2005 Years since quittin.6  Smokeless tobacco: Never Used Substance Use Topics  Alcohol use: No  
 Drug use: Not on file Review of Systems Constitutional: Negative for chills, fever and weight loss. HENT: Negative for nosebleeds. Eyes: Negative for blurred vision and double vision. Respiratory: Negative for cough, shortness of breath and wheezing. Cardiovascular: Negative for chest pain, palpitations, orthopnea, claudication, leg swelling and PND. Gastrointestinal: Negative for abdominal pain, heartburn, nausea and vomiting. Genitourinary: Negative for dysuria and hematuria. Musculoskeletal: Negative for falls and myalgias. Skin: Negative for rash. Neurological: Negative for dizziness, focal weakness and headaches. Endo/Heme/Allergies: Does not bruise/bleed easily. Psychiatric/Behavioral: Positive for memory loss. Negative for substance abuse. Visit Vitals /70 Pulse (!) 51 Ht 5' 11\" (1.803 m) Wt 77.1 kg (170 lb) SpO2 98% BMI 23.71 kg/m² Physical Exam  
Constitutional: He is oriented to person, place, and time. He appears well-developed and well-nourished. HENT:  
Head: Normocephalic and atraumatic. Eyes: Conjunctivae are normal.  
Neck: Neck supple. No JVD present. Carotid bruit is not present. Cardiovascular: Regular rhythm, S1 normal, S2 normal and normal pulses. Bradycardia present. Exam reveals no gallop and no S3. No murmur heard. Pulmonary/Chest: Breath sounds normal. He has no wheezes. He has no rales. Abdominal: Soft. Bowel sounds are normal. There is no tenderness. Musculoskeletal: He exhibits no edema. Neurological: He is alert and oriented to person, place, and time. Skin: Skin is warm and dry. EKG: Sinus bradycardia, normal axis, borderline low voltage throughout, right bundle branch block, poor R-wave progression, cannot exclude prior anterior infarct, no ST or T-wave abnormalities concerning for ischemia. No change compared to the prior EKG. ASSESSMENT and PLAN Coronary artery disease. Status post emergent PCI in the setting of an acute anterior wall myocardial infarction July 2015 with subsequent angioplasty and drug-eluting stent placement to his mid LAD. Patient had residual branch vessel disease to a small obtuse marginal branch and moderate nonobstructive CAD elsewhere. His ejection fraction improved to 55% on a follow-up echocardiogram.  He remains on dual antiplatelet therapy with an aspirin and clopidogrel. He is also been treated with a stable dose of a beta-blocker and a potent statin. History is now somewhat limited due to his dementia. I have recommended a surveillance pharmacologic nuclear stress test to evaluate for any large areas of ischemia. Dyslipidemia. Patient is now maintained on Crestor 20 mg nightly. His goal LDL should be under 70. Essential hypertension. Patient's blood pressure is now reasonably well-controlled on a low-dose of carvedilol and lisinopril. Both which I would continue. Asymptomatic bradycardia. Patient's heart rate remains between 50 and 55 bpm.  He continues to be asymptomatic. I will continue his low-dose beta-blocker for now. Dementia. Patient has had progressive worsening of his memory loss over the past 12+ months. Follow-up annually, sooner if needed.

## 2019-04-17 NOTE — PROGRESS NOTES
Jhon Osorio presents today for Chief Complaint Patient presents with  Coronary Artery Disease 1 year follow up - no cardiac complaints Jhon Osorio preferred language for health care discussion is english/other. Is someone accompanying this pt? Wife Is the patient using any DME equipment during OV? No  
 
Depression Screening: 
3 most recent PHQ Screens 4/17/2019 Little interest or pleasure in doing things Not at all Feeling down, depressed, irritable, or hopeless Not at all Total Score PHQ 2 0 Learning Assessment: 
Learning Assessment 4/17/2019 PRIMARY LEARNER Patient HIGHEST LEVEL OF EDUCATION - PRIMARY LEARNER  -  
BARRIERS PRIMARY LEARNER -  
CO-LEARNER CAREGIVER -  
PRIMARY LANGUAGE ENGLISH  NEED -  
LEARNER PREFERENCE PRIMARY DEMONSTRATION  
ANSWERED BY Patient RELATIONSHIP SELF Abuse Screening: No flowsheet data found. Fall Risk Fall Risk Assessment, last 12 mths 4/17/2019 Able to walk? Yes Fall in past 12 months? No  
 
 
Pt currently taking Anticoagulant therapy? ASA 81mg every day and Plavix Coordination of Care: 1. Have you been to the ER, urgent care clinic since your last visit? Hospitalized since your last visit? no 
 
2. Have you seen or consulted any other health care providers outside of the [address] [place] [address] since your last visit? Include any pap smears or colon screening.  no

## 2019-05-07 NOTE — PROGRESS NOTES
Patient was injected with 71.7 millicuries 06XPK Sestamibi on 5/7/19 at 0810. Patient was injected with 06.6 millicuries 69NKH Sestamibi on 5/7/19 at 0915. Patient's armbands were removed and placed in shred-it box.  
 
Patient had a Nuclear Lexiscan Stress Test.

## 2019-05-07 NOTE — PROGRESS NOTES
Per your last note\" Coronary artery disease. Status post emergent PCI in the setting of an acute anterior wall myocardial infarction July 2015 with subsequent angioplasty and drug-eluting stent placement to his mid LAD. Patient had residual branch vessel disease to a small obtuse marginal branch and moderate nonobstructive CAD elsewhere. His ejection fraction improved to 55% on a follow-up echocardiogram.  He remains on dual antiplatelet therapy with an aspirin and clopidogrel. He is also been treated with a stable dose of a beta-blocker and a potent statin. History is now somewhat limited due to his dementia. I have recommended a surveillance pharmacologic nuclear stress test to evaluate for any large areas of ischemia.

## 2020-01-01 ENCOUNTER — HOSPITAL ENCOUNTER (OUTPATIENT)
Dept: LAB | Age: 75
Discharge: HOME | End: 2020-04-22

## 2020-01-01 LAB
ALBUMIN SERPL-MCNC: 3.6 G/DL (ref 3.4–5)
ALBUMIN/GLOB SERPL: 1.1 {RATIO} (ref 0.8–1.7)
ALP SERPL-CCNC: 116 U/L (ref 45–117)
ALT SERPL-CCNC: 30 U/L (ref 16–61)
ANION GAP SERPL CALC-SCNC: 9 MMOL/L (ref 3–18)
AST SERPL-CCNC: 27 U/L (ref 10–38)
BASOPHILS # BLD: 0 K/UL (ref 0–0.1)
BASOPHILS NFR BLD: 0 % (ref 0–2)
BILIRUB SERPL-MCNC: 1.5 MG/DL (ref 0.2–1)
BUN SERPL-MCNC: 22 MG/DL (ref 7–18)
BUN/CREAT SERPL: 18 (ref 12–20)
CALCIUM SERPL-MCNC: 8.3 MG/DL (ref 8.5–10.1)
CHLORIDE SERPL-SCNC: 111 MMOL/L (ref 100–111)
CO2 SERPL-SCNC: 25 MMOL/L (ref 21–32)
CREAT SERPL-MCNC: 1.22 MG/DL (ref 0.6–1.3)
DIFFERENTIAL METHOD BLD: ABNORMAL
EOSINOPHIL # BLD: 0 K/UL (ref 0–0.4)
EOSINOPHIL NFR BLD: 0 % (ref 0–5)
ERYTHROCYTE [DISTWIDTH] IN BLOOD BY AUTOMATED COUNT: 12.8 % (ref 11.6–14.5)
GLOBULIN SER CALC-MCNC: 3.3 G/DL (ref 2–4)
GLUCOSE SERPL-MCNC: 115 MG/DL (ref 74–99)
HCT VFR BLD AUTO: 48.6 % (ref 36–48)
HGB BLD-MCNC: 17 G/DL (ref 13–16)
LYMPHOCYTES # BLD: 1.1 K/UL (ref 0.9–3.6)
LYMPHOCYTES NFR BLD: 16 % (ref 21–52)
MCH RBC QN AUTO: 32.3 PG (ref 24–34)
MCHC RBC AUTO-ENTMCNC: 35 G/DL (ref 31–37)
MCV RBC AUTO: 92.2 FL (ref 74–97)
MONOCYTES # BLD: 0.8 K/UL (ref 0.05–1.2)
MONOCYTES NFR BLD: 11 % (ref 3–10)
NEUTS SEG # BLD: 4.8 K/UL (ref 1.8–8)
NEUTS SEG NFR BLD: 73 % (ref 40–73)
PLATELET # BLD AUTO: 189 K/UL (ref 135–420)
PMV BLD AUTO: 10.3 FL (ref 9.2–11.8)
POTASSIUM SERPL-SCNC: 3.5 MMOL/L (ref 3.5–5.5)
PROT SERPL-MCNC: 6.9 G/DL (ref 6.4–8.2)
RBC # BLD AUTO: 5.27 M/UL (ref 4.7–5.5)
SODIUM SERPL-SCNC: 145 MMOL/L (ref 136–145)
WBC # BLD AUTO: 6.7 K/UL (ref 4.6–13.2)

## 2020-01-01 PROCEDURE — 80053 COMPREHEN METABOLIC PANEL: CPT

## 2020-01-01 PROCEDURE — 85025 COMPLETE CBC W/AUTO DIFF WBC: CPT
